# Patient Record
(demographics unavailable — no encounter records)

---

## 2025-01-09 NOTE — HISTORY OF PRESENT ILLNESS
[FreeTextEntry1] : Here to establish primary care and for annual CPE Has recently moved from [place], one main reason is that she wants to improve her lifestyle here  [de-identified] : History of obesity On Ozempic since at least 2022, now on a maintenance dose of 0.5mg (more because that's the dose her insurance approves now?) Last dose taken a month ago  Originally 70 lbs heavier  History of preDM  ADHD, on Vyvanse 50mg   Anxiety due to ADHD that is improving on Vyvanse   Thinks there was a component of emotional/obsessive eating which has improved on Vyvanse  Looking to change psych provider   Chronic insomnia, sleep-onset Takes propranolol 20mg + Benadryl + PRN NSAID if any HA Trazodone 50mg + PRN Ambien ER 12.5mg Has also Xanax 0.5mg to fall asleep if she wakes up in the middle of the night Daily cannabis smoking for this too, has now stopped though  Chronic allergies, eczema, hay fever Requesting montelukast refill Levocetirizine OTC   Gyn/Sexual Hx Menses: Cycles are regular, no sig dysmenorrhea Sexually active: Male partner Contraception: No, okay w/ pregnancy  History of STIs: No  HCM - Pap smear: Last done ~1 year  - Mammogram: Needs referral  - Colonoscopy: 2024, wnl  - Vaccines: Tdap - unsure / HPV - unsure / COVID/Flu - unsure

## 2025-01-09 NOTE — HEALTH RISK ASSESSMENT
[4 or more  times a week (4 pts)] : 4 or more  times a week (4 points) [3 or 4 (1 pt)] : 3 or 4  (1 point) [Less than monthly (1 pt)] : Less than monthly (1 point) [Current] : Current [0-4] : 0-4 [Yes] : In the past 12 months have you used drugs other than those required for medical reasons? Yes [Little interest or pleasure doing things] : 1) Little interest or pleasure doing things [Feeling down, depressed, or hopeless] : 2) Feeling down, depressed, or hopeless [0] : 2) Feeling down, depressed, or hopeless: Not at all (0) [PHQ-2 Negative - No further assessment needed] : PHQ-2 Negative - No further assessment needed [de-identified] : Beer/wine with dinner + 1-3 nights out socially  [Audit-CScore] : 6 [de-identified] : Cannabis smoking, has now stopped  [de-identified] : Biking to and from work, likes yoga/pilates  [de-identified] : AM: Cup of coffee + microwaved breakfast sandwich or peanut butter bread w/ apple or bagel / Lunch: Prepackaged forzen food / In the last year has decreased cooking at home  [[id number]] : 0 [de-identified] : Occasional, social tobacco / Overall, uses nicotine vapes

## 2025-01-09 NOTE — HISTORY OF PRESENT ILLNESS
[FreeTextEntry1] : Here to establish primary care and for annual CPE Has recently moved from [place], one main reason is that she wants to improve her lifestyle here  [de-identified] : History of obesity On Ozempic since at least 2022, now on a maintenance dose of 0.5mg (more because that's the dose her insurance approves now?) Last dose taken a month ago  Originally 70 lbs heavier  History of preDM  ADHD, on Vyvanse 50mg   Anxiety due to ADHD that is improving on Vyvanse   Thinks there was a component of emotional/obsessive eating which has improved on Vyvanse  Looking to change psych provider   Chronic insomnia, sleep-onset Takes propranolol 20mg + Benadryl + PRN NSAID if any HA Trazodone 50mg + PRN Ambien ER 12.5mg Has also Xanax 0.5mg to fall asleep if she wakes up in the middle of the night Daily cannabis smoking for this too, has now stopped though  Chronic allergies, eczema, hay fever Requesting montelukast refill Levocetirizine OTC   Gyn/Sexual Hx Menses: Cycles are regular, no sig dysmenorrhea Sexually active: Male partner Contraception: No, okay w/ pregnancy  History of STIs: No  HCM - Pap smear: Last done ~1 year  - Mammogram: Needs referral  - Colonoscopy: 2024, wnl  - Vaccines: Tdap - unsure / HPV - unsure / COVID/Flu - unsure

## 2025-01-09 NOTE — ASSESSMENT
[FreeTextEntry1] : #History of obesity #History of preDM Currently on maintenance dose of Ozempic 0.5mg, in overweight category  - Prescribe Ozempic 0.5mg - Diet: Advised lean protein and fiber optimized plant-forward whole food diet with calorie control, minimize ultra-processed foods - Exercise: Strive for regular physical activity throughout the day (walks, exercise snacks, house chores, gardening, hiking, taking stairs, playing sports - everything counts). Optimally, advised to incorporate both cardio and weightlifting every week to improve cardiorespiratory fitness as well as muscle and bone strength. - Follow up in 1 month  #ADHD - chronic - Continue Vyvanse 50mg as per current psych provider - Refer to behavioral health access center to help her find a new ADHD specialist    #Chronic insomnia, sleep-onset On multiple prescribed/OTC medications: Takes propranolol 20mg + Benadryl + PRN NSAID if any HA at night / Trazodone 50mg + PRN Ambien ER 12.5mg / Has also Xanax 0.5mg to fall asleep if she wakes up in the middle of the night - Sleep hygiene discussed. Plan for sleep in advance. Advised 7-9 hours based on how well-rested one feels with consistent sleep-wake timings. - For now, advised to not use benzodiazepines for sleeping as they're both not first line and have multiple undesirable side effects - Agree with stopping cannabis smoking, she's motivated to change her lifestyle - In future visits, will address this more e.g. referral for CBT-i  #Chronic allergies, eczema, hay fever - Refill montelukast. Discussed boxed warning of neuropsychiatric side effects such as nightmares, aggression, depression, and suicidal ideation. This is a chronic medication for her and so far she feels no side effects. Advised to alert me if any.  - May take levocetirizine OTC   #HCM - Pap smear: Last done ~1 year  - Mammogram: Provide referral  - Colonoscopy: 2024, wnl >>> obtain report   - Vaccines: Tdap - unsure / HPV - unsure / COVID/Flu - unsure >>> can get them here on Tue/Thu if interested

## 2025-01-09 NOTE — HEALTH RISK ASSESSMENT
Pt to return on 9/12/17 for Herceptin. Copies of medication list and upcoming appointments given prior to discharge.    [4 or more  times a week (4 pts)] : 4 or more  times a week (4 points) [3 or 4 (1 pt)] : 3 or 4  (1 point) [Less than monthly (1 pt)] : Less than monthly (1 point) [Current] : Current [0-4] : 0-4 [Yes] : In the past 12 months have you used drugs other than those required for medical reasons? Yes [Little interest or pleasure doing things] : 1) Little interest or pleasure doing things [Feeling down, depressed, or hopeless] : 2) Feeling down, depressed, or hopeless [0] : 2) Feeling down, depressed, or hopeless: Not at all (0) [PHQ-2 Negative - No further assessment needed] : PHQ-2 Negative - No further assessment needed [de-identified] : Beer/wine with dinner + 1-3 nights out socially  [Audit-CScore] : 6 [de-identified] : Cannabis smoking, has now stopped  [de-identified] : Biking to and from work, likes yoga/pilates  [de-identified] : AM: Cup of coffee + microwaved breakfast sandwich or peanut butter bread w/ apple or bagel / Lunch: Prepackaged [employer] food / In the last year has decreased cooking at home  [[id number]] : 0 [de-identified] : Occasional, social tobacco / Overall, uses nicotine vapes

## 2025-07-10 NOTE — HISTORY OF PRESENT ILLNESS
[Home] : at home, [unfilled] , at the time of the visit. [Medical Office: ([facility])___] : at the medical office located in  [Telehealth (audio & video)] : This visit was provided via telehealth using real-time 2-way audio visual technology. [Verbal consent obtained from patient] : the patient, [unfilled] [FreeTextEntry1] : Obesity management, on Wegovy [de-identified] : History of obesity Originally 70 lbs heavier w/ history of preDM and HLD On Ozempic since at least 2022, maintenance dose of 0.5mg Switched to Wegovy 0.5mg, has been off of it for a couple of weeks Weight ~184 lbs today, last known weight ~174 lbs in Jan 2025  HCM - Pap smear: Last done ~1 year ago - Mammogram: Referral was provided, will provide again  - Colonoscopy: 2024, wnl >>> obtain report - Vaccines: Tdap - unsure / HPV - unsure / COVID/Flu - unsure >>> can get them here if interested.

## 2025-07-10 NOTE — HISTORY OF PRESENT ILLNESS
[Home] : at home, [unfilled] , at the time of the visit. [Medical Office: ([facility])___] : at the medical office located in  [Telehealth (audio & video)] : This visit was provided via telehealth using real-time 2-way audio visual technology. [Verbal consent obtained from patient] : the patient, [unfilled] [FreeTextEntry1] : Obesity management, on Wegovy [de-identified] : History of obesity Originally 70 lbs heavier w/ history of preDM and HLD On Ozempic since at least 2022, maintenance dose of 0.5mg Switched to Wegovy 0.5mg, has been off of it for a couple of weeks Weight ~184 lbs today, last known weight ~174 lbs in Jan 2025  HCM - Pap smear: Last done ~1 year ago - Mammogram: Referral was provided, will provide again  - Colonoscopy: 2024, wnl >>> obtain report - Vaccines: Tdap - unsure / HPV - unsure / COVID/Flu - unsure >>> can get them here if interested.

## 2025-07-10 NOTE — PHYSICAL EXAM
[No Acute Distress] : no acute distress [Normal Voice/Communication] : normal voice/communication [Alert and Oriented x3] : oriented to person, place, and time [Normal Insight/Judgement] : insight and judgment were intact

## 2025-07-10 NOTE — ASSESSMENT
[FreeTextEntry1] : Increase Wegovy to 1mg every week Extensive lifestyle counseling provided regarding diet, exercise, sleep, and stress optimization  Follow up in 3 months, consider in-person visit for repeat labs